# Patient Record
Sex: FEMALE | Race: WHITE | URBAN - METROPOLITAN AREA
[De-identification: names, ages, dates, MRNs, and addresses within clinical notes are randomized per-mention and may not be internally consistent; named-entity substitution may affect disease eponyms.]

---

## 2019-04-30 ENCOUNTER — OFFICE VISIT (OUTPATIENT)
Dept: URBAN - METROPOLITAN AREA CLINIC 22 | Facility: CLINIC | Age: 19
End: 2019-04-30
Payer: COMMERCIAL

## 2019-04-30 DIAGNOSIS — H52.03 HYPERMETROPIA, BILATERAL: Primary | ICD-10-CM

## 2019-04-30 PROCEDURE — 92015 DETERMINE REFRACTIVE STATE: CPT | Performed by: OPTOMETRIST

## 2019-04-30 PROCEDURE — 92012 INTRM OPH EXAM EST PATIENT: CPT | Performed by: OPTOMETRIST

## 2019-04-30 PROCEDURE — 92002 INTRM OPH EXAM NEW PATIENT: CPT | Performed by: OPTOMETRIST

## 2019-04-30 ASSESSMENT — INTRAOCULAR PRESSURE
OD: 18
OS: 18

## 2019-04-30 ASSESSMENT — VISUAL ACUITY
OD: 20/20
OS: 20/20

## 2019-04-30 NOTE — IMPRESSION/PLAN
Impression: Hypermetropia, bilateral: H52.03. OU. Plan: Dispensed new Rx, discussed changes with patient.

## 2020-05-13 ENCOUNTER — OFFICE VISIT (OUTPATIENT)
Dept: URBAN - METROPOLITAN AREA CLINIC 22 | Facility: CLINIC | Age: 20
End: 2020-05-13
Payer: COMMERCIAL

## 2020-05-13 PROCEDURE — 92014 COMPRE OPH EXAM EST PT 1/>: CPT | Performed by: OPTOMETRIST

## 2020-05-13 PROCEDURE — 92015 DETERMINE REFRACTIVE STATE: CPT | Performed by: OPTOMETRIST

## 2020-05-13 ASSESSMENT — INTRAOCULAR PRESSURE
OD: 22
OS: 18

## 2020-05-13 ASSESSMENT — VISUAL ACUITY
OS: 20/20
OD: 20/20